# Patient Record
Sex: FEMALE | Race: OTHER | HISPANIC OR LATINO | URBAN - METROPOLITAN AREA
[De-identification: names, ages, dates, MRNs, and addresses within clinical notes are randomized per-mention and may not be internally consistent; named-entity substitution may affect disease eponyms.]

---

## 2017-02-26 ENCOUNTER — INPATIENT (INPATIENT)
Facility: HOSPITAL | Age: 27
LOS: 2 days | Discharge: HOME | DRG: 343 | End: 2017-03-01
Attending: SURGERY | Admitting: SURGERY
Payer: MEDICAID

## 2017-02-26 ENCOUNTER — TRANSCRIPTION ENCOUNTER (OUTPATIENT)
Age: 27
End: 2017-02-26

## 2017-02-26 VITALS
SYSTOLIC BLOOD PRESSURE: 130 MMHG | WEIGHT: 97 LBS | HEIGHT: 55 IN | OXYGEN SATURATION: 99 % | HEART RATE: 99 BPM | DIASTOLIC BLOOD PRESSURE: 80 MMHG | TEMPERATURE: 98 F | RESPIRATION RATE: 20 BRPM

## 2017-02-26 DIAGNOSIS — R10.9 UNSPECIFIED ABDOMINAL PAIN: ICD-10-CM

## 2017-02-26 DIAGNOSIS — K35.3 ACUTE APPENDICITIS WITH LOCALIZED PERITONITIS: ICD-10-CM

## 2017-02-26 LAB
ALBUMIN SERPL ELPH-MCNC: 4.5 G/DL — SIGNIFICANT CHANGE UP (ref 3.3–5.2)
ALP SERPL-CCNC: 54 U/L — SIGNIFICANT CHANGE UP (ref 40–120)
ALT FLD-CCNC: 10 U/L — SIGNIFICANT CHANGE UP
ANION GAP SERPL CALC-SCNC: 15 MMOL/L — SIGNIFICANT CHANGE UP (ref 5–17)
APPEARANCE UR: CLEAR — SIGNIFICANT CHANGE UP
AST SERPL-CCNC: 14 U/L — SIGNIFICANT CHANGE UP
BASOPHILS # BLD AUTO: 0 K/UL — SIGNIFICANT CHANGE UP (ref 0–0.2)
BASOPHILS NFR BLD AUTO: 0.1 % — SIGNIFICANT CHANGE UP (ref 0–2)
BILIRUB SERPL-MCNC: 0.2 MG/DL — LOW (ref 0.4–2)
BILIRUB UR-MCNC: NEGATIVE — SIGNIFICANT CHANGE UP
BUN SERPL-MCNC: 11 MG/DL — SIGNIFICANT CHANGE UP (ref 8–20)
CALCIUM SERPL-MCNC: 9.6 MG/DL — SIGNIFICANT CHANGE UP (ref 8.6–10.2)
CHLORIDE SERPL-SCNC: 101 MMOL/L — SIGNIFICANT CHANGE UP (ref 98–107)
CO2 SERPL-SCNC: 23 MMOL/L — SIGNIFICANT CHANGE UP (ref 22–29)
COLOR SPEC: SIGNIFICANT CHANGE UP
CREAT SERPL-MCNC: 0.48 MG/DL — LOW (ref 0.5–1.3)
DIFF PNL FLD: ABNORMAL
EOSINOPHIL # BLD AUTO: 0 K/UL — SIGNIFICANT CHANGE UP (ref 0–0.5)
GLUCOSE SERPL-MCNC: 97 MG/DL — SIGNIFICANT CHANGE UP (ref 70–115)
GLUCOSE UR QL: NEGATIVE MG/DL — SIGNIFICANT CHANGE UP
HCG SERPL-ACNC: <2 MIU/ML — SIGNIFICANT CHANGE UP
HCT VFR BLD CALC: 35.7 % — LOW (ref 37–47)
HGB BLD-MCNC: 12.2 G/DL — SIGNIFICANT CHANGE UP (ref 12–16)
KETONES UR-MCNC: ABNORMAL
LEUKOCYTE ESTERASE UR-ACNC: NEGATIVE — SIGNIFICANT CHANGE UP
LIDOCAIN IGE QN: 46 U/L — SIGNIFICANT CHANGE UP (ref 22–51)
LYMPHOCYTES # BLD AUTO: 1.4 K/UL — SIGNIFICANT CHANGE UP (ref 1–4.8)
LYMPHOCYTES # BLD AUTO: 6.2 % — LOW (ref 20–55)
MCHC RBC-ENTMCNC: 30.7 PG — SIGNIFICANT CHANGE UP (ref 27–31)
MCHC RBC-ENTMCNC: 34.2 G/DL — SIGNIFICANT CHANGE UP (ref 32–36)
MCV RBC AUTO: 89.7 FL — SIGNIFICANT CHANGE UP (ref 81–99)
MONOCYTES # BLD AUTO: 1.4 K/UL — HIGH (ref 0–0.8)
MONOCYTES NFR BLD AUTO: 6.4 % — SIGNIFICANT CHANGE UP (ref 3–10)
NEUTROPHILS # BLD AUTO: 19.5 K/UL — HIGH (ref 1.8–8)
NEUTROPHILS NFR BLD AUTO: 87.1 % — HIGH (ref 37–73)
NITRITE UR-MCNC: NEGATIVE — SIGNIFICANT CHANGE UP
PH UR: 6 — SIGNIFICANT CHANGE UP (ref 4.8–8)
PLAT MORPH BLD: NORMAL — SIGNIFICANT CHANGE UP
PLATELET # BLD AUTO: 336 K/UL — SIGNIFICANT CHANGE UP (ref 150–400)
POTASSIUM SERPL-MCNC: 3.8 MMOL/L — SIGNIFICANT CHANGE UP (ref 3.5–5.3)
POTASSIUM SERPL-SCNC: 3.8 MMOL/L — SIGNIFICANT CHANGE UP (ref 3.5–5.3)
PROT SERPL-MCNC: 8.6 G/DL — SIGNIFICANT CHANGE UP (ref 6.6–8.7)
PROT UR-MCNC: NEGATIVE MG/DL — SIGNIFICANT CHANGE UP
RBC # BLD: 3.98 M/UL — LOW (ref 4.4–5.2)
RBC # FLD: 12.5 % — SIGNIFICANT CHANGE UP (ref 11–15.6)
RBC BLD AUTO: NORMAL — SIGNIFICANT CHANGE UP
SODIUM SERPL-SCNC: 139 MMOL/L — SIGNIFICANT CHANGE UP (ref 135–145)
SP GR SPEC: 1.01 — SIGNIFICANT CHANGE UP (ref 1.01–1.02)
UROBILINOGEN FLD QL: NEGATIVE MG/DL — SIGNIFICANT CHANGE UP
WBC # BLD: 22.4 K/UL — HIGH (ref 4.8–10.8)
WBC # FLD AUTO: 22.4 K/UL — HIGH (ref 4.8–10.8)
WBC UR QL: SIGNIFICANT CHANGE UP

## 2017-02-26 PROCEDURE — 99284 EMERGENCY DEPT VISIT MOD MDM: CPT | Mod: 25

## 2017-02-26 PROCEDURE — 88304 TISSUE EXAM BY PATHOLOGIST: CPT | Mod: 26

## 2017-02-26 PROCEDURE — 74177 CT ABD & PELVIS W/CONTRAST: CPT | Mod: 26

## 2017-02-26 PROCEDURE — 44970 LAPAROSCOPY APPENDECTOMY: CPT

## 2017-02-26 RX ORDER — CEFOTETAN DISODIUM 1 G
2 VIAL (EA) INJECTION ONCE
Qty: 0 | Refills: 0 | Status: DISCONTINUED | OUTPATIENT
Start: 2017-02-26 | End: 2017-02-26

## 2017-02-26 RX ORDER — CEFOTETAN DISODIUM 1 G
2 VIAL (EA) INJECTION EVERY 12 HOURS
Qty: 0 | Refills: 0 | Status: DISCONTINUED | OUTPATIENT
Start: 2017-02-27 | End: 2017-02-27

## 2017-02-26 RX ORDER — SODIUM CHLORIDE 9 MG/ML
1000 INJECTION INTRAMUSCULAR; INTRAVENOUS; SUBCUTANEOUS
Qty: 0 | Refills: 0 | Status: DISCONTINUED | OUTPATIENT
Start: 2017-02-26 | End: 2017-02-26

## 2017-02-26 RX ORDER — DOCUSATE SODIUM 100 MG
100 CAPSULE ORAL THREE TIMES A DAY
Qty: 0 | Refills: 0 | Status: DISCONTINUED | OUTPATIENT
Start: 2017-02-26 | End: 2017-03-01

## 2017-02-26 RX ORDER — SODIUM CHLORIDE 9 MG/ML
1000 INJECTION, SOLUTION INTRAVENOUS
Qty: 0 | Refills: 0 | Status: DISCONTINUED | OUTPATIENT
Start: 2017-02-26 | End: 2017-02-27

## 2017-02-26 RX ORDER — ACETAMINOPHEN 500 MG
650 TABLET ORAL EVERY 6 HOURS
Qty: 0 | Refills: 0 | Status: DISCONTINUED | OUTPATIENT
Start: 2017-02-26 | End: 2017-03-01

## 2017-02-26 RX ORDER — CEFOTETAN DISODIUM 1 G
2 VIAL (EA) INJECTION ONCE
Qty: 0 | Refills: 0 | Status: COMPLETED | OUTPATIENT
Start: 2017-02-26 | End: 2017-02-26

## 2017-02-26 RX ORDER — SODIUM CHLORIDE 9 MG/ML
3 INJECTION INTRAMUSCULAR; INTRAVENOUS; SUBCUTANEOUS ONCE
Qty: 0 | Refills: 0 | Status: COMPLETED | OUTPATIENT
Start: 2017-02-26 | End: 2017-02-26

## 2017-02-26 RX ORDER — ONDANSETRON 8 MG/1
4 TABLET, FILM COATED ORAL ONCE
Qty: 0 | Refills: 0 | Status: COMPLETED | OUTPATIENT
Start: 2017-02-26 | End: 2017-02-26

## 2017-02-26 RX ORDER — IBUPROFEN 200 MG
400 TABLET ORAL EVERY 6 HOURS
Qty: 0 | Refills: 0 | Status: DISCONTINUED | OUTPATIENT
Start: 2017-02-26 | End: 2017-03-01

## 2017-02-26 RX ORDER — ONDANSETRON 8 MG/1
4 TABLET, FILM COATED ORAL EVERY 6 HOURS
Qty: 0 | Refills: 0 | Status: DISCONTINUED | OUTPATIENT
Start: 2017-02-26 | End: 2017-03-01

## 2017-02-26 RX ORDER — SENNA PLUS 8.6 MG/1
2 TABLET ORAL AT BEDTIME
Qty: 0 | Refills: 0 | Status: DISCONTINUED | OUTPATIENT
Start: 2017-02-26 | End: 2017-03-01

## 2017-02-26 RX ORDER — FENTANYL CITRATE 50 UG/ML
50 INJECTION INTRAVENOUS
Qty: 0 | Refills: 0 | Status: DISCONTINUED | OUTPATIENT
Start: 2017-02-26 | End: 2017-02-26

## 2017-02-26 RX ORDER — CEFOTETAN DISODIUM 1 G
VIAL (EA) INJECTION
Qty: 0 | Refills: 0 | Status: DISCONTINUED | OUTPATIENT
Start: 2017-02-26 | End: 2017-02-27

## 2017-02-26 RX ADMIN — SODIUM CHLORIDE 3 MILLILITER(S): 9 INJECTION INTRAMUSCULAR; INTRAVENOUS; SUBCUTANEOUS at 08:50

## 2017-02-26 RX ADMIN — ONDANSETRON 4 MILLIGRAM(S): 8 TABLET, FILM COATED ORAL at 18:31

## 2017-02-26 RX ADMIN — Medication 100 MILLIGRAM(S): at 22:40

## 2017-02-26 RX ADMIN — SODIUM CHLORIDE 100 MILLILITER(S): 9 INJECTION, SOLUTION INTRAVENOUS at 22:40

## 2017-02-26 RX ADMIN — FENTANYL CITRATE 50 MICROGRAM(S): 50 INJECTION INTRAVENOUS at 17:43

## 2017-02-26 RX ADMIN — FENTANYL CITRATE 50 MICROGRAM(S): 50 INJECTION INTRAVENOUS at 20:54

## 2017-02-26 RX ADMIN — FENTANYL CITRATE 50 MICROGRAM(S): 50 INJECTION INTRAVENOUS at 17:42

## 2017-02-26 RX ADMIN — Medication 100 GRAM(S): at 15:33

## 2017-02-26 NOTE — DISCHARGE NOTE ADULT - CARE PLAN
Principal Discharge DX:	Acute appendicitis with localized peritonitis  Goal:	resolved, s/p laparoscopic appendectomy  Instructions for follow-up, activity and diet:	/ f.u in 2 weeks, please call to make an appointment   / no heavy lifting or pushing more than 10 lbs for 4-6 weeks   / do not operate heavy machinery when taking pain meds   / regular diet as tolerated Principal Discharge DX:	Acute appendicitis with localized peritonitis  Goal:	Alleviation of pain and symptoms  Instructions for follow-up, activity and diet:	Follow up: Please call and make an appointment with the [facility] 10-14 days after discharge. Also, please call and make an appointment with your primary care physician as per your usual schedule.   Activity: Please, limit activity and rest until follow up appointment. Avoid heavy lifting > 10-15 lbs  Diet: May continue regular pre-hospital diet  Medications: Please take all home medications as prescribed by your primary care doctor. Pain medication has been prescribed for you. Please, take it as it has been prescribed, do not drive or operate heavy machinery while taking narcotics.   Wound Care: Please, keep wound site clean and dry. You may shower, but do not bathe.  If confusion, altered mental status, fever, chest pain, shortness of breath, new or worsening abdominal pain, vomiting, change or worsening of medical status, please come back to the emergency room, and in case of emergency call 911.

## 2017-02-26 NOTE — ED ADULT NURSE NOTE - OBJECTIVE STATEMENT
Pt care assumed at 0800, presents to ED awake, alert and oriented x3 c/o abdominal pain. Pt reports having RLQ pain started last night at approx 2300, worsening around 0300. Pt denies any accompanying nausea, vomiting, diarrhea, fever or sick contacts. Abdomen is soft, tender on deep palpation to RLQ, non distended. Pt in no apparent distress at this time, voices no other complaints. Breathing is even and unlabored. IV access established, #20g to right ac, intact and patent with ns flush. Labs drawn and sent, results pending. Urine specimen obtained and sent to lab, results pending. Will continue to monitor and reassess.

## 2017-02-26 NOTE — DISCHARGE NOTE ADULT - HOSPITAL COURSE
A 27 yo F presented to ED c/o abdominal pain in AM. Pain located RLQ, on PE she was tender. Afebrile, normotensive w elevated WBC count and a shift. CT A/P confirmed acute appendicitis. She was consented for laparoscopic appendectomy which she tolerated well. Started on a diet, OOB and voiding. Pain well controlled w PO meds. Will be discharged and f/u in clinic in 2 weeks. Advised to avoid any heavy lifting or pushing more than 10 lbs for 4 - 6 weeks, and do not operate heavy machinery when taking pain meds. She was also advised to return to ED or call her doctor if experiences the following but not limited to: fever, chills, nausea, vomiting, severe abdominal pain, fatigue, constipation etc. She understands and agrees to plan. A 27 yo F presented to ED c/o abdominal pain in AM. Pain located RLQ, on PE she was tender. Afebrile, normotensive w. elevated WBC count and a shift. CT A/P confirmed acute appendicitis. She was consented for laparoscopic appendectomy which was performed on 2/26. Procedure completed without complication and pt was transferred to PACU and then the floor in stable condition. Post-operatively, pt started on a diet, OOB and voiding. Pain well controlled w PO meds. Will be discharged and f/u in clinic in 10-14 days. Advised to avoid any heavy lifting or pushing more than 10 lbs for 4 - 6 weeks, and do not operate heavy machinery when taking pain meds. She was also advised to return to ED or call her doctor if experiences the following but not limited to: fever, chills, nausea, vomiting, severe abdominal pain, fatigue, constipation etc. She understands and agrees to plan. A 27 yo F presented to ED c/o abdominal pain in AM. Pain located RLQ, on PE she was tender. Afebrile, normotensive w. elevated WBC count and a shift. CT A/P confirmed acute appendicitis. She was consented for laparoscopic appendectomy which was performed on 2/26. Procedure completed without complication and pt was transferred to PACU and then the floor in stable condition. Post-operatively, pt started on a diet. Pain well controlled w PO meds. Will be discharged and f/u in clinic in 10-14 days. Advised to avoid any heavy lifting or pushing more than 10 lbs for 4 - 6 weeks, and do not operate heavy machinery when taking pain meds. She was also advised to return to ED or call her doctor if experiences the following but not limited to: fever, chills, nausea, vomiting, severe abdominal pain, fatigue, constipation etc. She understands and agrees to plan. A 27 yo F presented to ED c/o abdominal pain in AM. Pain located RLQ, on PE she was tender. Afebrile, normotensive w. elevated WBC count and a shift. CT A/P confirmed acute appendicitis. She was consented for laparoscopic appendectomy which was performed on 2/26. Procedure completed without complication and pt was transferred to PACU and then the floor in stable condition. Post-operatively, pt not able to urinate. Repeat bladder scans showed 100-200cc / urine. Bond placed on 2/27 PM with 200cc yellow urine output. IVF started. Renal U/S WNL. Repeat BMP with normalized BUN, Cr, and GFR. Bond d/c'ed 2/28 and patient subsequently able to void without difficulty. Pt started on a diet. Pain well controlled w PO meds. Will be discharged and f/u in clinic in 10-14 days. Advised to avoid any heavy lifting or pushing more than 10 lbs for 4 - 6 weeks, and do not operate heavy machinery when taking pain meds. She was also advised to return to ED or call her doctor if experiences the following but not limited to: fever, chills, nausea, vomiting, severe abdominal pain, fatigue, constipation etc. She understands and agrees to plan.

## 2017-02-26 NOTE — DISCHARGE NOTE ADULT - PLAN OF CARE
resolved, s/p laparoscopic appendectomy / f.u in 2 weeks, please call to make an appointment   / no heavy lifting or pushing more than 10 lbs for 4-6 weeks   / do not operate heavy machinery when taking pain meds   / regular diet as tolerated Alleviation of pain and symptoms Follow up: Please call and make an appointment with the [facility] 10-14 days after discharge. Also, please call and make an appointment with your primary care physician as per your usual schedule.   Activity: Please, limit activity and rest until follow up appointment. Avoid heavy lifting > 10-15 lbs  Diet: May continue regular pre-hospital diet  Medications: Please take all home medications as prescribed by your primary care doctor. Pain medication has been prescribed for you. Please, take it as it has been prescribed, do not drive or operate heavy machinery while taking narcotics.   Wound Care: Please, keep wound site clean and dry. You may shower, but do not bathe.  If confusion, altered mental status, fever, chest pain, shortness of breath, new or worsening abdominal pain, vomiting, change or worsening of medical status, please come back to the emergency room, and in case of emergency call 911.

## 2017-02-26 NOTE — H&P ADULT. - ASSESSMENT
A 25 yo f c/o abdominal pain since this AM. Pain is located in RLQ and she's tender. Denies F/C/N/V, however does endorse loss of appetite. Labs are noticeable for leukocytosis and shift. CT A/P confirms acute appendicitis.

## 2017-02-26 NOTE — DISCHARGE NOTE ADULT - PROVIDER TOKENS
JONATAN:'[id number]' FREE:[LAST:[[facility]],PHONE:[phone]],FAX:[(   [phone]    -],ADDRESS:[[facility] - [address] [place] [address]]]

## 2017-02-26 NOTE — DISCHARGE NOTE ADULT - MEDICATION SUMMARY - MEDICATIONS TO TAKE
I will START or STAY ON the medications listed below when I get home from the hospital:    Percocet 5/325 oral tablet  -- 1 tab(s) by mouth every 4-6 hours, As Needed -for moderate-severe pain MDD:6  -- Caution federal law prohibits the transfer of this drug to any person other  than the person for whom it was prescribed.  May cause drowsiness.  Alcohol may intensify this effect.  Use care when operating dangerous machinery.  This prescription cannot be refilled.  This product contains acetaminophen.  Do not use  with any other product containing acetaminophen to prevent possible liver damage.  Using more of this medication than prescribed may cause serious breathing problems.    -- Indication: For Moderate - severe pain

## 2017-02-26 NOTE — DISCHARGE NOTE ADULT - CARE PROVIDERS DIRECT ADDRESSES
[email or web address]

## 2017-02-26 NOTE — DISCHARGE NOTE ADULT - NS AS ACTIVITY OBS
Do not drive or operate machinery/Walking-Outdoors allowed/No Heavy lifting/straining/Showering allowed/Do not make important decisions/Walking-Indoors allowed

## 2017-02-26 NOTE — DISCHARGE NOTE ADULT - CARE PROVIDER_API CALL
Galo Castle), Surgery; Surgical Critical Care  [address] [place] [address]  Phone: [phone]  Fax: [phone] [facility],   [address] - [address] [place] [address]  Phone: [phone]  Fax: (   )    -

## 2017-02-26 NOTE — H&P ADULT. - PROBLEM SELECTOR PLAN 1
- NPO, IV fluids  - cefotetan IV  - consent for laparoscopic appendectomy   - analgesics as needed   - VTE ppx   - seen and eval w Dr. Castle

## 2017-02-26 NOTE — ED PROVIDER NOTE - CARE PLAN
Principal Discharge DX:	Abdominal pain Principal Discharge DX:	Abdominal pain  Secondary Diagnosis:	Appendicitis

## 2017-02-26 NOTE — ED PROVIDER NOTE - OBJECTIVE STATEMENT
25 yo female with abd pain since earlier last night worse this am, denies fever + nausea no vomiting  no sick contacts no dysuria or other complaints

## 2017-02-27 LAB
CULTURE RESULTS: SIGNIFICANT CHANGE UP
SPECIMEN SOURCE: SIGNIFICANT CHANGE UP

## 2017-02-27 PROCEDURE — 76775 US EXAM ABDO BACK WALL LIM: CPT | Mod: 26

## 2017-02-27 RX ORDER — SODIUM CHLORIDE 9 MG/ML
1000 INJECTION, SOLUTION INTRAVENOUS
Qty: 0 | Refills: 0 | Status: DISCONTINUED | OUTPATIENT
Start: 2017-02-27 | End: 2017-03-01

## 2017-02-27 RX ORDER — SODIUM CHLORIDE 9 MG/ML
1000 INJECTION, SOLUTION INTRAVENOUS ONCE
Qty: 0 | Refills: 0 | Status: COMPLETED | OUTPATIENT
Start: 2017-02-27 | End: 2017-02-27

## 2017-02-27 RX ORDER — TAMSULOSIN HYDROCHLORIDE 0.4 MG/1
0.4 CAPSULE ORAL ONCE
Qty: 0 | Refills: 0 | Status: COMPLETED | OUTPATIENT
Start: 2017-02-27 | End: 2017-02-27

## 2017-02-27 RX ADMIN — SODIUM CHLORIDE 1000 MILLILITER(S): 9 INJECTION, SOLUTION INTRAVENOUS at 19:19

## 2017-02-27 RX ADMIN — Medication 100 GRAM(S): at 05:48

## 2017-02-27 RX ADMIN — SODIUM CHLORIDE 80 MILLILITER(S): 9 INJECTION, SOLUTION INTRAVENOUS at 21:59

## 2017-02-27 RX ADMIN — Medication 400 MILLIGRAM(S): at 02:33

## 2017-02-27 RX ADMIN — Medication 400 MILLIGRAM(S): at 01:20

## 2017-02-27 RX ADMIN — Medication 100 MILLIGRAM(S): at 21:59

## 2017-02-27 RX ADMIN — Medication 100 MILLIGRAM(S): at 05:48

## 2017-02-27 RX ADMIN — TAMSULOSIN HYDROCHLORIDE 0.4 MILLIGRAM(S): 0.4 CAPSULE ORAL at 03:33

## 2017-02-27 NOTE — PROGRESS NOTE ADULT - SUBJECTIVE AND OBJECTIVE BOX
INTERVAL HPI/OVERNIGHT EVENTS:    STATUS POST:  laparoscopic appendectomy     POST OPERATIVE DAY #: 1    SUBJECTIVE:  PT seen and examined as a POC. PT underwent Laparoscopic appendectomy. Pt tolerated procedure well with no complications. Pain is minimal and controlled with meds. Pt tolerating regular diet. Denies N/V/F/C/CP/SOB      MEDICATIONS  (STANDING):  lactated ringers. 1000milliLiter(s) IV Continuous <Continuous>  docusate sodium 100milliGRAM(s) Oral three times a day  cefoTEtan  IVPB  IV Intermittent   cefoTEtan  IVPB 2Gram(s) IV Intermittent every 12 hours    MEDICATIONS  (PRN):  ondansetron Injectable 4milliGRAM(s) IV Push every 6 hours PRN Nausea  senna 2Tablet(s) Oral at bedtime PRN Constipation  acetaminophen   Tablet. 650milliGRAM(s) Oral every 6 hours PRN Mild Pain (1 - 3)  ibuprofen  Tablet 400milliGRAM(s) Oral every 6 hours PRN moderate pain  oxyCODONE  5 mG/acetaminophen 325 mG 1Tablet(s) Oral every 4 hours PRN Severe Pain (7 - 10)      Vital Signs Last 24 Hrs  T(C): 37, Max: 37.4 (02-26 @ 16:56)  T(F): 98.6, Max: 99.3 (02-26 @ 16:56)  HR: 70 (67 - 99)  BP: 111/69 (100/66 - 143/82)  BP(mean): 85 (75 - 93)  RR: 17 (10 - 20)  SpO2: 98% (96% - 100%)    PHYSICAL EXAM:      Constitutional: A&Ox3, NAD    Eyes: EOMI    Cardiovascular: RRR    Gastrointestinal: soft, ND, TTP at incision sites, No erythema or discharge, Incisions are well approximated     Neurological: GCS 15          I&O's Detail    I & Os for current day (as of 2017 02:08)  =============================================  IN:    lactated ringers.: 300 ml    Total IN: 300 ml  ---------------------------------------------  OUT:    Total OUT: 0 ml  ---------------------------------------------  Total NET: 300 ml      LABS:                        12.2   22.4  )-----------( 336      ( 2017 09:11 )             35.7     2017 09:11    139    |  101    |  11.0   ----------------------------<  97     3.8     |  23.0   |  0.48     Ca    9.6        2017 09:11    TPro  8.6    /  Alb  4.5    /  TBili  0.2    /  DBili  x      /  AST  14     /  ALT  10     /  AlkPhos  54     2017 09:11      Urinalysis Basic - ( 2017 08:55 )    Color: Pale Yellow / Appearance: Clear / S.010 / pH: x  Gluc: x / Ketone: Small  / Bili: Negative / Urobili: Negative mg/dL   Blood: x / Protein: Negative mg/dL / Nitrite: Negative   Leuk Esterase: Negative / RBC: x / WBC 0-2   Sq Epi: x / Non Sq Epi: x / Bacteria: x        RADIOLOGY & ADDITIONAL STUDIES:

## 2017-02-28 LAB — SURGICAL PATHOLOGY FINAL REPORT - CH: SIGNIFICANT CHANGE UP

## 2017-02-28 RX ORDER — ENOXAPARIN SODIUM 100 MG/ML
40 INJECTION SUBCUTANEOUS DAILY
Qty: 0 | Refills: 0 | Status: DISCONTINUED | OUTPATIENT
Start: 2017-02-28 | End: 2017-03-01

## 2017-02-28 RX ORDER — POTASSIUM CHLORIDE 20 MEQ
40 PACKET (EA) ORAL EVERY 4 HOURS
Qty: 0 | Refills: 0 | Status: COMPLETED | OUTPATIENT
Start: 2017-02-28 | End: 2017-02-28

## 2017-02-28 RX ADMIN — Medication 100 MILLIGRAM(S): at 18:20

## 2017-02-28 RX ADMIN — Medication 100 MILLIGRAM(S): at 05:14

## 2017-02-28 RX ADMIN — SODIUM CHLORIDE 80 MILLILITER(S): 9 INJECTION, SOLUTION INTRAVENOUS at 21:01

## 2017-02-28 RX ADMIN — Medication 400 MILLIGRAM(S): at 07:59

## 2017-02-28 RX ADMIN — Medication 400 MILLIGRAM(S): at 07:57

## 2017-02-28 RX ADMIN — Medication 100 MILLIGRAM(S): at 21:00

## 2017-02-28 RX ADMIN — Medication 40 MILLIEQUIVALENT(S): at 21:00

## 2017-02-28 RX ADMIN — ENOXAPARIN SODIUM 40 MILLIGRAM(S): 100 INJECTION SUBCUTANEOUS at 18:20

## 2017-02-28 RX ADMIN — Medication 40 MILLIEQUIVALENT(S): at 18:23

## 2017-02-28 NOTE — PROGRESS NOTE ADULT - PROBLEM SELECTOR PLAN 1
1. Regular diet   2. pain management   3. f/u am labs  4. monitor Bowel function.   Possible DC of danielle today

## 2017-02-28 NOTE — PROGRESS NOTE ADULT - SUBJECTIVE AND OBJECTIVE BOX
INTERVAL HPI/OVERNIGHT EVENTS:    STATUS POST:  2    POST OPERATIVE DAY #: laparoscopic appendectomy     SUBJECTIVE:  Pt seen and examined in the am. Pt yesterday had low urine output yesterday and a santos was inserted and IVF started. This am UOP has been 2,325. Pt complains of worsening abdominal pain in the LLQ this am. Denies flatus or BM. Pt has been ambulatory. Denies N/V/F/C.       MEDICATIONS  (STANDING):  docusate sodium 100milliGRAM(s) Oral three times a day  lactated ringers. 1000milliLiter(s) IV Continuous <Continuous>    MEDICATIONS  (PRN):  ondansetron Injectable 4milliGRAM(s) IV Push every 6 hours PRN Nausea  senna 2Tablet(s) Oral at bedtime PRN Constipation  acetaminophen   Tablet. 650milliGRAM(s) Oral every 6 hours PRN Mild Pain (1 - 3)  ibuprofen  Tablet 400milliGRAM(s) Oral every 6 hours PRN moderate pain  oxyCODONE  5 mG/acetaminophen 325 mG 1Tablet(s) Oral every 4 hours PRN Severe Pain (7 - 10)      Vital Signs Last 24 Hrs  T(C): 36.7, Max: 37 (02-27 @ 20:47)  T(F): 98.1, Max: 98.6 (02-27 @ 20:47)  HR: 60 (60 - 107)  BP: 166/76 (98/66 - 166/76)  BP(mean): --  RR: 18 (18 - 20)  SpO2: 97% (97% - 100%)    PHYSICAL EXAM:      Constitutional: A&Ox3, NAD    Eyes: EOMI    Cardiovascular: RRR    Gastrointestinal: soft, ND, TTP at incision sites and LLQ, No erythema or discharge, Incisions are well approximated     Genitourinary Santos in place, clear urine     Neurological: GCS 15    I&O's Detail    I & Os for current day (as of 2017 08:02)  =============================================  IN:    Oral Fluid: 1250 ml    lactated ringers.: 960 ml    Total IN: 2210 ml  ---------------------------------------------  OUT:    Indwelling Catheter - Urethral: 2725 ml    Total OUT: 2725 ml  ---------------------------------------------  Total NET: -515 ml      LABS:                        12.2   22.4  )-----------( 336      ( 2017 09:11 )             35.7     2017 05:20    140    |  106    |  8.0    ----------------------------<  115    3.7     |  24.0   |  0.82     Ca    8.6        2017 05:20    TPro  8.6    /  Alb  4.5    /  TBili  0.2    /  DBili  x      /  AST  14     /  ALT  10     /  AlkPhos  54     2017 09:11      Urinalysis Basic - ( 2017 19:13 )    Color: Yellow / Appearance: Clear / S.025 / pH: x  Gluc: x / Ketone: Moderate  / Bili: Negative / Urobili: Negative mg/dL   Blood: x / Protein: Negative mg/dL / Nitrite: Negative   Leuk Esterase: Negative / RBC: 0-2 /HPF / WBC 0-2   Sq Epi: x / Non Sq Epi: Occasional / Bacteria: x        RADIOLOGY & ADDITIONAL STUDIES:

## 2017-02-28 NOTE — PROGRESS NOTE ADULT - SUBJECTIVE AND OBJECTIVE BOX
pt pod 1 sp lap appy under GETA. Tolerated well. Denies any A/c. issues with urine output resolving.   pt with no n/v @ this time. using pain meds as ordered/needed with some pain relief. vss. spont vent. no issues with anesthesia at this time. pt resting comfortably @ this time.

## 2017-02-28 NOTE — PROGRESS NOTE ADULT - ATTENDING COMMENTS
s/p lap appy. yesterday with decrease UO, santos placed and 2L bolus given . todays bun/Cr has normalized . will d/c santos today and keep on the ivf. possible d/c tomorrow.

## 2017-03-01 VITALS — DIASTOLIC BLOOD PRESSURE: 78 MMHG | HEART RATE: 94 BPM | SYSTOLIC BLOOD PRESSURE: 126 MMHG

## 2017-03-01 LAB
ANION GAP SERPL CALC-SCNC: 9 MMOL/L — SIGNIFICANT CHANGE UP (ref 5–17)
BUN SERPL-MCNC: 9 MG/DL — SIGNIFICANT CHANGE UP (ref 8–20)
CALCIUM SERPL-MCNC: 9.1 MG/DL — SIGNIFICANT CHANGE UP (ref 8.6–10.2)
CHLORIDE SERPL-SCNC: 103 MMOL/L — SIGNIFICANT CHANGE UP (ref 98–107)
CO2 SERPL-SCNC: 25 MMOL/L — SIGNIFICANT CHANGE UP (ref 22–29)
CREAT SERPL-MCNC: 0.81 MG/DL — SIGNIFICANT CHANGE UP (ref 0.5–1.3)
GLUCOSE SERPL-MCNC: 95 MG/DL — SIGNIFICANT CHANGE UP (ref 70–115)
MAGNESIUM SERPL-MCNC: 1.8 MG/DL — SIGNIFICANT CHANGE UP (ref 1.8–2.5)
POTASSIUM SERPL-MCNC: 4.7 MMOL/L — SIGNIFICANT CHANGE UP (ref 3.5–5.3)
POTASSIUM SERPL-SCNC: 4.7 MMOL/L — SIGNIFICANT CHANGE UP (ref 3.5–5.3)
SODIUM SERPL-SCNC: 137 MMOL/L — SIGNIFICANT CHANGE UP (ref 135–145)

## 2017-03-01 RX ADMIN — Medication 100 MILLIGRAM(S): at 05:42

## 2017-03-07 PROBLEM — Z00.00 ENCOUNTER FOR PREVENTIVE HEALTH EXAMINATION: Status: ACTIVE | Noted: 2017-03-07

## 2017-03-16 ENCOUNTER — APPOINTMENT (OUTPATIENT)
Dept: TRAUMA SURGERY | Facility: CLINIC | Age: 27
End: 2017-03-16

## 2017-03-16 VITALS
RESPIRATION RATE: 19 BRPM | SYSTOLIC BLOOD PRESSURE: 134 MMHG | TEMPERATURE: 98.2 F | HEART RATE: 93 BPM | DIASTOLIC BLOOD PRESSURE: 90 MMHG | WEIGHT: 94 LBS | BODY MASS INDEX: 21.76 KG/M2 | OXYGEN SATURATION: 99 % | HEIGHT: 55.25 IN

## 2017-03-16 DIAGNOSIS — Z90.49 ACQUIRED ABSENCE OF OTHER SPECIFIED PARTS OF DIGESTIVE TRACT: ICD-10-CM

## 2017-03-20 PROBLEM — Z90.49 STATUS POST APPENDECTOMY: Status: ACTIVE | Noted: 2017-03-20

## 2017-07-23 PROCEDURE — 99285 EMERGENCY DEPT VISIT HI MDM: CPT | Mod: 25

## 2017-07-23 PROCEDURE — 84300 ASSAY OF URINE SODIUM: CPT

## 2017-07-23 PROCEDURE — 74177 CT ABD & PELVIS W/CONTRAST: CPT

## 2017-07-23 PROCEDURE — 84133 ASSAY OF URINE POTASSIUM: CPT

## 2017-07-23 PROCEDURE — 85027 COMPLETE CBC AUTOMATED: CPT

## 2017-07-23 PROCEDURE — 83690 ASSAY OF LIPASE: CPT

## 2017-07-23 PROCEDURE — 80053 COMPREHEN METABOLIC PANEL: CPT

## 2017-07-23 PROCEDURE — 76775 US EXAM ABDO BACK WALL LIM: CPT

## 2017-07-23 PROCEDURE — 36415 COLL VENOUS BLD VENIPUNCTURE: CPT

## 2017-07-23 PROCEDURE — 82436 ASSAY OF URINE CHLORIDE: CPT

## 2017-07-23 PROCEDURE — 87086 URINE CULTURE/COLONY COUNT: CPT

## 2017-07-23 PROCEDURE — 84702 CHORIONIC GONADOTROPIN TEST: CPT

## 2017-07-23 PROCEDURE — 83935 ASSAY OF URINE OSMOLALITY: CPT

## 2017-07-23 PROCEDURE — 83735 ASSAY OF MAGNESIUM: CPT

## 2017-07-23 PROCEDURE — 81001 URINALYSIS AUTO W/SCOPE: CPT

## 2017-07-23 PROCEDURE — 88304 TISSUE EXAM BY PATHOLOGIST: CPT

## 2017-07-23 PROCEDURE — 82570 ASSAY OF URINE CREATININE: CPT

## 2017-07-23 PROCEDURE — 80048 BASIC METABOLIC PNL TOTAL CA: CPT

## 2021-07-27 NOTE — PRE-OP CHECKLIST - IDENTIFICATION BAND VERIFIED
QI outreach to schedule an office visit for:MWV. Patient Left message to return call to clinic. Pls help pt schedule a MWV with PCP.  Thanks     done

## 2022-08-02 NOTE — ED ADULT NURSE NOTE - NS ED NURSE REPORT GIVEN DT
Anesthesia Pre Eval Note    Anesthesia ROS/Med Hx    Overall Review:  EKG was reviewed     Anesthetic Complication History:  Patient does not have a history of anesthetic complications      Pulmonary Review:  Patient does not have a pulmonary history    Negative for sleep apnea   Negative for COPD   The patient is not a smoker.    Neuro/Psych Review:  Patient does not have a neuro/psych history   Negative for seizures   Negative for CVA    Cardiovascular Review:  Exercise tolerance: good (>4 METS)  Negative for past MI  Negative for ICD   Negative for pacemaker   Positive for hypertension  Positive for hyperlipidemia    GI/HEPATIC/RENAL Review:  Patient does not have a GI/hepatic/renalhistory     Negative for GERD    End/Other Review:    Positive for obesity class III - 40.00 - 49.99  Additional Results:  EKG:  Encounter Date: 22  -ELECTROCARDIOGRAM 12-LEAD:                                                                 Narrative    Test was performed.    :1995  AGE:27 year old    Ordering provider: Shobha Reyes NP    Diagnosis: Pre-op evaluation (Z01.818)                                                                     Impression    Normal Sinus rhythm    Normal ECG         Reviewed and agreed upon by Dr ZAINA Ku     ALLERGIES:  No Known Allergies       Last Labs        Component                Value               Date/Time                  WBC                      8.9                 2022 1404            RBC                      4.76                2022 1404            HGB                      13.8                2022 1404            HCT                      41.4                2022 1404            MCV                      87.0                2022 1404            MCH                      29.0                2022 1404            MCHC                     33.3                2022 1404            RDW-CV                   12.7                2022 1404             Sodium                   142                 06/03/2022 1404            Potassium                4.3                 06/03/2022 1404            Chloride                 104                 06/03/2022 1404            Carbon Dioxide           29                  06/03/2022 1404            Glucose                  85                  06/03/2022 1404            BUN                      16                  06/03/2022 1404            Creatinine               0.88                06/03/2022 1404            Glomerular Filtrati*     >90                 06/03/2022 1404            Calcium                  9.6                 06/03/2022 1404            PLT                      314                 06/03/2022 1404        Patient Vitals in the past 24 hrs:  08/02/22 0832, BP:(!) 162/74, Temp:36.6 °C (97.9 °F), Temp src:Oral, Pulse:84, Resp:16, SpO2:98 %, Height:5' 6\" (1.676 m), Weight:135.6 kg (299 lb 0.9 oz)      Relevant Problems   No relevant active problems       Physical Exam     Airway   Mallampati: II  TM Distance: >3 FB  Neck ROM: Full  Neck: Able to place in sniff position  TMJ Mobility: Good    Cardiovascular  Cardiovascular exam normal  Cardio Rhythm: Regular  Cardio Rate: Normal    Head Assessment  Head assessment: Normocephalic and Atraumatic    General Assessment  General Assessment: Alert and oriented and No acute distress    Dental Exam  Dental exam normal    Pulmonary Exam  Pulmonary exam normal  Breath sounds clear to auscultation:  Yes    Abdominal Exam    Patient Demonstrates:  Obese      Anesthesia Plan:    ASA Status: 3  Anesthesia Type: General    Induction: Intravenous  Preferred Airway Type: ETT  Checklist  Reviewed: Lab Results, EKG, NPO Status, Allergies, Medications, Problem list and Past Med History  Consent/Risks Discussed Statement:  The proposed anesthetic plan, including its risks and benefits, have been discussed with the Patient along with the risks and benefits of alternatives. Questions  were encouraged and answered and the patient and/or representative understands and agrees to proceed.        I discussed with the patient (and/or patient's legal representative) the risks and benefits of the proposed anesthesia plan, General, which may include services performed by other anesthesia providers.    Alternative anesthesia plans, if available, were reviewed with the patient (and/or patient's legal representative). Discussion has been held with the patient (and/or patient's legal representative) regarding risks of anesthesia, which include sore throat, vomiting and nausea and emergent situations that may require change in anesthesia plan.    The patient (and/or patient's legal representative) has indicated understanding, his/her questions have been answered, and he/she wishes to proceed with the planned anesthetic.    Blood Products: Not Anticipated     26-Feb-2017 13:26

## 2023-09-11 NOTE — ED ADULT NURSE NOTE - DISCHARGE DATE/TIME
Comment: R/O Sebaceous hyperplasia vs BCC
Render Risk Assessment In Note?: no
Detail Level: Simple
26-Feb-2017 13:25

## 2025-05-30 NOTE — H&P ADULT. - HISTORY OF PRESENT ILLNESS
A 27 yo F presents to ED c/o abdominal pain since this AM. Pain is localized to RLQ, 8/10 when worse, and denies any associated symptoms, including F/C/N/V. Denies change in bowel or urinary habits. LMP was 2 weeks ago, she's regular q30 days. Denies CP, SOB, diaphoresis, or any other constitutional symptoms.
5